# Patient Record
Sex: FEMALE | Race: WHITE
[De-identification: names, ages, dates, MRNs, and addresses within clinical notes are randomized per-mention and may not be internally consistent; named-entity substitution may affect disease eponyms.]

---

## 2023-09-01 ENCOUNTER — HOSPITAL ENCOUNTER (EMERGENCY)
Dept: HOSPITAL 97 - ER | Age: 73
Discharge: HOME | End: 2023-09-01
Payer: COMMERCIAL

## 2023-09-01 VITALS — TEMPERATURE: 98.5 F | DIASTOLIC BLOOD PRESSURE: 79 MMHG | OXYGEN SATURATION: 99 % | SYSTOLIC BLOOD PRESSURE: 155 MMHG
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
## 2023-09-01 DIAGNOSIS — Z96.653: ICD-10-CM

## 2023-09-01 DIAGNOSIS — S42.201A: Primary | ICD-10-CM

## 2023-09-01 PROCEDURE — 99284 EMERGENCY DEPT VISIT MOD MDM: CPT

## 2023-09-01 PROCEDURE — 73060 X-RAY EXAM OF HUMERUS: CPT

## 2023-09-01 PROCEDURE — 96372 THER/PROPH/DIAG INJ SC/IM: CPT

## 2023-09-01 NOTE — EDPHYS
Physician Documentation                                                                           

 [facility]                                                                 

Name: Pilar Seals                                                                               

Age: 72 yrs                                                                                       

Sex: Female                                                                                       

: 1950                                                                                   

MRN: [id number]                                                                                   

Arrival Date: 2023                                                                          

Time: 18:43                                                                                       

Account#: [id number]                                                                            

Bed 14                                                                                            

Private MD:                                                                                       

ED Physician Marcus Causey                                                                         

HPI:                                                                                              

                                                                                             

19:12 This 72 yrs old Female presents to ER via Wheelchair with complaints of Fall Injury,    snw 

      Arm Injury.                                                                                 

19:12 Details of fall: The patient fell from an upright position, while walking, tripped over snw 

      curb onto slab of beach house. Onset: The symptoms/episode began/occurred suddenly,         

      just prior to arrival. Associated injuries: The patient sustained right arm, contusion,     

      decreased range of motion, painful injury. Severity of symptoms: At their worst the         

      symptoms were moderate, severe. The patient has not experienced similar symptoms in the     

      past. It is unknown whether or not the patient has recently seen a physician. denies        

      LOC.                                                                                        

                                                                                                  

Historical:                                                                                       

- Allergies:                                                                                      

19:06 No Known Allergies;                                                                     db  

- PMHx:                                                                                           

19:06 Hypertensive disorder;                                                                  db  

- PSHx:                                                                                           

19:06 KNEE BILATERAL REPLACEMENT;                                                             db  

                                                                                                  

- Immunization history: Last tetanus immunization: unknown.                                       

- Social history:: Smoking status: Patient denies any tobacco usage or history of.                

                                                                                                  

                                                                                                  

ROS:                                                                                              

19:11 Constitutional: Negative for fever, chills, and weight loss, Eyes: Negative for injury, snw 

      pain, redness, and discharge, ENT: Negative for injury, pain, and discharge, Neck:          

      Negative for injury, pain, and swelling, Cardiovascular: Negative for chest pain,           

      palpitations, and edema, Respiratory: Negative for shortness of breath, cough,              

      wheezing, and pleuritic chest pain, Abdomen/GI: Negative for abdominal pain, nausea,        

      vomiting, diarrhea, and constipation, Back: Negative for injury and pain, : Negative      

      for injury, bleeding, discharge, and swelling, Skin: Negative for injury, rash, and         

      discoloration, Neuro: Negative for headache, weakness, numbness, tingling, and seizure,     

      Psych: Negative for depression, anxiety, suicide ideation, homicidal ideation, and          

      hallucinations.                                                                             

19:11 MS/extremity: Positive for injury or acute deformity, decreased range of motion, pain,      

      tenderness, of the right arm.                                                               

                                                                                                  

Exam:                                                                                             

19:11 Constitutional:  This is a well developed, well nourished patient who is awake, alert,  snw 

      and in no acute distress. Head/Face:  Normocephalic, atraumatic. Eyes:  Pupils equal        

      round and reactive to light, extra-ocular motions intact.  Lids and lashes normal.          

      Conjunctiva and sclera are non-icteric and not injected.  Cornea within normal limits.      

      Periorbital areas with no swelling, redness, or edema. ENT:  Nares patent. No nasal         

      discharge, no septal abnormalities noted.  Tympanic membranes are normal and external       

      auditory canals are clear.  Oropharynx with no redness, swelling, or masses, exudates,      

      or evidence of obstruction, uvula midline.  Mucous membranes moist. Neck:  Trachea          

      midline, no thyromegaly or masses palpated, and no cervical lymphadenopathy.  Supple,       

      full range of motion without nuchal rigidity, or vertebral point tenderness.  No            

      Meningismus. Chest/axilla:  Normal chest wall appearance and motion.  Nontender with no     

      deformity.  No lesions are appreciated. Cardiovascular:  Regular rate and rhythm with a     

      normal S1 and S2.  No gallops, murmurs, or rubs.  Normal PMI, no JVD.  No pulse             

      deficits. Respiratory:  Lungs have equal breath sounds bilaterally, clear to                

      auscultation and percussion.  No rales, rhonchi or wheezes noted.  No increased work of     

      breathing, no retractions or nasal flaring. Abdomen/GI:  Soft, non-tender, with normal      

      bowel sounds.  No distension or tympany.  No guarding or rebound.  No evidence of           

      tenderness throughout. Back:  No spinal tenderness.  No costovertebral tenderness.          

      Full range of motion. Skin:  Warm, dry with normal turgor.  Normal color with no            

      rashes, no lesions, and no evidence of cellulitis. Neuro:  Awake and alert, GCS 15,         

      oriented to person, place, time, and situation.  Cranial nerves II-XII grossly intact.      

      Motor strength 5/5 in all extremities.  Sensory grossly intact.  Cerebellar exam            

      normal.  Normal gait. Psych:  Awake, alert, with orientation to person, place and time.     

       Behavior, mood, and affect are within normal limits.                                       

19:11 Musculoskeletal/extremity: Extremities: grossly normal except: noted in the right           

      arm/humerus: decreased ROM, pain.                                                           

                                                                                                  

Vital Signs:                                                                                      

19:00  / 77; Pulse 67; Resp 18; Temp 98.5(O); Pulse Ox 99% ; Weight 110.22 kg; Height 5 db  

      ft. 5 in. ; Pain 2/10;                                                                      

19:50  / 79; Pulse 64; Resp 16 S; Pulse Ox 99% on R/A;                                  ha1 

19:00 Body Mass Index 40.44 (110.22 kg, 165.1 cm)                                             db  

19:00 Pain Scale: Adult                                                                       db  

                                                                                                  

[place] Score:                                                                               

19:06 Eye Response: spontaneous(4). Motor Response: obeys commands(6). Verbal Response:       db  

      oriented(5). Total: 15.                                                                     

                                                                                                  

Trauma Score (Adult):                                                                             

19:06 Eye Response: spontaneous(1); Verbal Response: oriented(1); Motor Response: obeys       db  

      commands(2); Systolic BP: > 89 mm Hg(4); Respiratory Rate: 10 to 29 per min(4); [place]     

      Score: 15; Trauma Score: 12                                                                 

                                                                                                  

MDM:                                                                                              

18:59 Patient medically screened.                                                             snw 

19:41 Differential diagnosis: abrasion, fracture, laceration, sprain, strain. Data reviewed:  snw 

      vital signs, nurses notes, radiologic studies. I considered the following discharge         

      prescriptions or medication management in the emergency department Medications were         

      administered in the Emergency Department. See MAR. Independent interpretation of the        

      following test(s) in the Emergency Department X-Ray: My interpretation is impaction fx      

      right humerus. Counseling: I had a detailed discussion with the patient and/or guardian     

      regarding the historical points, exam findings, and any diagnostic results supporting       

      the discharge/admit diagnosis, the presence of at least one elevated blood pressure         

      reading (>120/80) during this emergency department visit, radiology results. Response       

      to treatment: the patient's symptoms have mildly improved after treatment, the              

      patient's symptoms have markedly improved after treatment. Special discussion: I have       

      referred the patient to see his PCP for further evaluation of high blood pressure.          

      Based on the history and exam findings, there is no indication for further emergent         

      testing or inpatient evaluation. I discussed with the patient/guardian the need to see      

      the orthopedic surgeon for further evaluation of the symptoms. I discussed with the         

      patient/guardian the need to see the primary care provider for further evaluation of        

      the symptoms.                                                                               

                                                                                                  

                                                                                             

19:05 Order name: Humerus Right XRAY                                                          snw 

                                                                                             

19:05 Order name: Jaquan; Complete Time: 20:08                                                 snw 

                                                                                                  

Administered Medications:                                                                         

19:32 Drug: HYDROmorphone IM 0.5 mg Route: IM; Site: right ventrogluteal;                     ha1 

20:08 Follow up: Response: No adverse reaction; Pain is decreased; RASS: Alert and Calm (0)   ha1 

                                                                                                  

                                                                                                  

Disposition:                                                                                      

20:28 Co-signature as Attending Physician, Marcus Causey MD I reviewed the patient's care       rn  

      provided by the Advanced Practice Provider and agree with the diagnosis and treatment       

      plan.                                                                                       

                                                                                                  

Disposition Summary:                                                                              

23 19:43                                                                                    

Discharge Ordered                                                                                 

      Location: Home                                                                          snw 

      Condition: Stable                                                                       snw 

      Diagnosis                                                                                   

        - Fall on same level from slipping, tripping and stumbling with subsequent striking   snw 

      against object                                                                              

        - Fracture of upper end of humerus                                                    snw 

      Followup:                                                                               snw 

        - With: Emergency Department                                                               

        - When: As needed                                                                          

        - Reason: Worsening of condition                                                           

      Followup:                                                                               snw 

        - With: Private Physician                                                                  

        - When: 2 - 3 days                                                                         

        - Reason: Recheck today's complaints, Continuance of care, Re-evaluation by your           

      physician                                                                                   

      Discharge Instructions:                                                                     

        - Discharge Summary Sheet                                                             snw 

        - Head Injury, Adult                                                                  snw 

        - Fall Prevention in the Home, Adult                                                  snw 

        - Humerus Fracture Treated With Immobilization                                        snw 

        - Humerus Fracture Treated With ORIF                                                  snw 

        - RICE Therapy for Routine Care of Injuries                                           snw 

        - How to Use a Sling                                                                  snw 

      Forms:                                                                                      

        - Medication Reconciliation Form                                                      snw 

        - Thank You Letter                                                                    snw 

        - Antibiotic Education                                                                snw 

        - Prescription Opioid Use                                                             snw 

        - Patient Portal Instructions                                                         snw 

        - Leadership Thank You Letter                                                         snw 

      Prescriptions:                                                                              

        - acetaminophen-codeine 300-30 mg Oral tablet                                              

            - take 1 tablet by ORAL route 4 times per day as needed for pain; 20 tablet;      snw 

      Refills: 0, Product Selection Permitted                                                     

        - Mobic 7.5 mg Oral Tablet                                                                 

            - take 1 tablet by ORAL route once daily take with food; 20 tablet; Refills: 0,   snw 

      Product Selection Permitted                                                                 

Signatures:                                                                                       

Dispatcher [employer]                           EDNisha Begum, ASTRID-C                   FNP-Csnw                                                  

Marcus Causey MD MD rn Ayala, Heidy RN                        RN   ha1                                                  

Cata Mccoy RN                    RN   db                                                   

                                                                                                  

**************************************************************************************************

## 2023-09-01 NOTE — ER
Nurse's Notes                                                                                     

 [facility]                                                                 

Name: Pilar Seals                                                                               

Age: 72 yrs                                                                                       

Sex: Female                                                                                       

: 1950                                                                                   

MRN: [id number]                                                                                   

Arrival Date: 2023                                                                          

Time: 18:43                                                                                       

Account#: [id number]                                                                            

Bed 14                                                                                            

Private MD:                                                                                       

Diagnosis: Fall on same level from slipping, tripping and stumbling with subsequent striking      

  against object;Fracture of upper end of humerus                                                 

                                                                                                  

Presentation:                                                                                     

                                                                                             

19:00 Chief complaint: Patient states: TRIPPED AND FELL TODAY. RIGHT UPPER ARM AND SHOULDER   db  

      PAIN. Coronavirus screen: Client denies travel out of the [place] in the last 14 days. At      

      this time, the client does not indicate any symptoms associated with coronavirus-19.        

      Ebola Screen: Patient negative for fever greater than or equal to 101.5 degrees             

      Fahrenheit, and additional compatible Ebola Virus Disease symptoms Patient denies           

      exposure to infectious person. Patient denies travel to an Ebola-affected area in the       

      21 days before illness onset. No symptoms or risks identified at this time. Initial         

      Sepsis Screen: Does the patient meet any 2 criteria? No. Patient's initial sepsis           

      screen is negative. Does the patient have a suspected source of infection? No.              

      Patient's initial sepsis screen is negative. Risk Assessment: Do you want to hurt           

      yourself or someone else? Patient reports no desire to harm self or others. Onset of        

      symptoms was 2023.                                                            

19:00 Method Of Arrival: Wheelchair                                                           db  

19:00 Acuity: PIEDAD 3                                                                           db  

19:06 Care prior to arrival: None. Mechanism of Injury: Fall from standing position. Trauma   db  

      event details: Injury occurred in the [facility].                                   

                                                                                                  

Triage Assessment:                                                                                

19:00 General: Appears in no apparent distress. uncomfortable.                                db  

                                                                                                  

Trauma Activation: Not Applicable                                                                 

 Physician: ED Physician; Name: ; Notified At: ; Arrived At:                                      

 Physician: General Surgeon; Name: ; Notified At: ; Arrived At:                                   

 Physician: Radiology; Name: ; Notified At: ; Arrived At:                                         

 Physician: Respiratory; Name: ; Notified At: ; Arrived At:                                       

 Physician: Lab; Name: ; Notified At: ; Arrived At:                                               

                                                                                                  

Historical:                                                                                       

- Allergies:                                                                                      

19:06 No Known Allergies;                                                                     db  

- PMHx:                                                                                           

19:06 Hypertensive disorder;                                                                  db  

- PSHx:                                                                                           

19:06 KNEE BILATERAL REPLACEMENT;                                                             db  

                                                                                                  

- Immunization history: Last tetanus immunization: unknown.                                       

- Social history:: Smoking status: Patient denies any tobacco usage or history of.                

                                                                                                  

                                                                                                  

Screenin:06 Abuse screen: Denies threats or abuse. Denies injuries from another. Tuberculosis       db  

      screening: No symptoms or risk factors identified.                                          

                                                                                                  

Primary Survey:                                                                                   

19:06 NO uncontrolled hemorrhage observed. Breathing/Chest: Spontaneous respiratory effort,   db  

      equal unlabored respirations, breath sounds clear bilaterally, regular pattern,             

      symmetrical chest rise and fall. Respiratory effort: spontaneous, Breath sounds: clear,     

      Respiratory pattern: regular, Chest inspection: symmetrical rise and fall of the chest.     

      Circulation: No external hemorrhage present. Regular and strong central pulse, skin         

      warm/dry/normal color. Disability Client is alert. Exposure/Environment: A warming          

      method has been applied: A warm blanket has been provided to the patient. Reassessment      

      Alertness and Airway: Awake and alert. The airway is patent. Breathing: Spontaneous         

      respiratory effort, equal unlabored respirations, breath sounds clear bilaterally,          

      regular pattern with symmetrical chest rise and fall. Circulation: No external              

      hemorrhage noted. Regular and strong central pulse, skin warm/dry/normal color.             

      Disability: Alert.                                                                          

                                                                                                  

Assessment:                                                                                       

18:59 Reassessment: Patient appears in no apparent distress at this time. Patient and/or      db  

      family updated on plan of care and expected duration. Pain level reassessed. Patient is     

      alert, oriented x 3, equal unlabored respirations, skin warm/dry/pink. RIGHT ARM PAIN.      

      TRIP AND FALL TODAY. Pain: Complains of pain in right arm.                                  

18:59 General: Appears in no apparent distress. uncomfortable, Behavior is calm, cooperative. db  

      Neuro: Level of Consciousness is awake, alert, obeys commands, Oriented to person,          

      place, time, situation. Respiratory: Airway is patent Respiratory effort is even,           

      unlabored, Respiratory pattern is regular, symmetrical.                                     

20:00 Reassessment: Patient and/or family updated on plan of care and expected duration. Pain ha1 

      level reassessed. Patient is alert, oriented x 3, equal unlabored respirations, skin        

      warm/dry/pink. Patient denies pain at this time. Patient states feeling better. Patient     

      states symptoms have improved.                                                              

                                                                                                  

Vital Signs:                                                                                      

19:00  / 77; Pulse 67; Resp 18; Temp 98.5(O); Pulse Ox 99% ; Weight 110.22 kg; Height 5 db  

      ft. 5 in. ; Pain 2/10;                                                                      

19:50  / 79; Pulse 64; Resp 16 S; Pulse Ox 99% on R/A;                                  ha1 

19:00 Body Mass Index 40.44 (110.22 kg, 165.1 cm)                                             db  

19:00 Pain Scale: Adult                                                                       db  

                                                                                                  

[place] Coma Score:                                                                               

19:06 Eye Response: spontaneous(4). Motor Response: obeys commands(6). Verbal Response:       db  

      oriented(5). Total: 15.                                                                     

                                                                                                  

Trauma Score (Adult):                                                                             

19:06 Eye Response: spontaneous(1); Verbal Response: oriented(1); Motor Response: obeys       db  

      commands(2); Systolic BP: > 89 mm Hg(4); Respiratory Rate: 10 to 29 per min(4); [place]     

      Score: 15; Trauma Score: 12                                                                 

                                                                                                  

ED Course:                                                                                        

18:46 Patient arrived in ED.                                                                  im  

18:52 [facility]C is [facility]                                                          snw 

18:52 Marcus Causey MD is Attending Physician.                                                snw 

18:58 Cata Mccoy, RN is Primary Nurse.                                                  db  

19:00 Arm band placed on Patient placed in an exam room.                                      db  

19:02 Triage completed.                                                                       db  

19:06 Patient has correct armband on for positive identification. Bed in low position. Call   db  

      light in reach. Side rails up X 1.                                                          

19:06 Patient maintains SpO2 saturation greater than 95% on room air.                         db  

19:40 Primary Nurse role handed off by Cata Mccoy, RN                                   wm  

19:46 Humerus Right XRAY In Process Unspecified.                                              EDMS

20:10 No provider procedures requiring assistance completed. Patient did not have IV access   ha1 

      during this emergency room visit.                                                           

20:11 Provided Education on: follow up.                                                       ha1 

                                                                                                  

Administered Medications:                                                                         

19:32 Drug: HYDROmorphone IM 0.5 mg Route: IM; Site: right ventrogluteal;                     ha1 

20:08 Follow up: Response: No adverse reaction; Pain is decreased; RASS: Alert and Calm (0)   ha1 

                                                                                                  

                                                                                                  

Medication:                                                                                       

20:10 VIS not applicable for this client.                                                     ha1 

                                                                                                  

Outcome:                                                                                          

19:43 Discharge ordered by MD.                                                                snw 

20:10 Discharged to home via wheelchair, with family.                                         ha1 

20:10 Condition: stable                                                                           

20:10 Discharge instructions given to patient, Instructed on discharge instructions, follow       

      up and referral plans. medication usage, Demonstrated understanding of instructions,        

      follow-up care, medications, Prescriptions given X 2.                                       

20:11 Patient left the ED.                                                                    ha1 

                                                                                                  

Signatures:                                                                                       

Dispatcher [employer]                           EDMS                                                 

Nisha Saleem, ALBERTO                   FNP-Pao Rodrigeuz Heidy RN                        RN   ha1                                                  

Cata Mccoy RN                    RN                                                      

Anali Almaraz                                                                                  

                                                                                                  

**************************************************************************************************

## 2023-09-01 NOTE — RAD REPORT
EXAM DESCRIPTION:  RAD - Humerus Right - 9/1/2023 7:44 pm

 

CLINICAL HISTORY:  SMASH INJURY

 

COMPARISON:  <Comparisons>

 

FINDINGS:  Diffuse osteopenia is noted. Mildly impacted fracture of the proximal right femur is seen.
 No dislocation.